# Patient Record
Sex: MALE | Race: WHITE | Employment: FULL TIME | URBAN - METROPOLITAN AREA
[De-identification: names, ages, dates, MRNs, and addresses within clinical notes are randomized per-mention and may not be internally consistent; named-entity substitution may affect disease eponyms.]

---

## 2018-11-15 ENCOUNTER — HOSPITAL ENCOUNTER (EMERGENCY)
Age: 43
Discharge: HOME | End: 2018-11-15
Attending: EMERGENCY MEDICINE
Payer: MEDICAID

## 2018-11-15 VITALS
OXYGEN SATURATION: 100 % | HEART RATE: 95 BPM | HEIGHT: 72 IN | SYSTOLIC BLOOD PRESSURE: 154 MMHG | WEIGHT: 195 LBS | BODY MASS INDEX: 26.41 KG/M2 | DIASTOLIC BLOOD PRESSURE: 96 MMHG | TEMPERATURE: 97.8 F | RESPIRATION RATE: 18 BRPM

## 2018-11-15 DIAGNOSIS — K04.7 DENTAL INFECTION: ICD-10-CM

## 2018-11-15 DIAGNOSIS — S02.5XXB OPEN FRACTURE OF TOOTH, INITIAL ENCOUNTER: ICD-10-CM

## 2018-11-15 DIAGNOSIS — R11.0 NAUSEA: ICD-10-CM

## 2018-11-15 DIAGNOSIS — R51.9 FACIAL PAIN: Primary | ICD-10-CM

## 2018-11-15 PROCEDURE — 99282 EMERGENCY DEPT VISIT SF MDM: CPT

## 2018-11-15 PROCEDURE — 6370000000 HC RX 637 (ALT 250 FOR IP): Performed by: EMERGENCY MEDICINE

## 2018-11-15 RX ORDER — PENICILLIN V POTASSIUM 500 MG/1
500 TABLET ORAL ONCE
Status: COMPLETED | OUTPATIENT
Start: 2018-11-15 | End: 2018-11-15

## 2018-11-15 RX ORDER — PENICILLIN V POTASSIUM 500 MG/1
500 TABLET ORAL 4 TIMES DAILY
Qty: 40 TABLET | Refills: 0 | Status: SHIPPED | OUTPATIENT
Start: 2018-11-15 | End: 2018-11-25

## 2018-11-15 RX ADMIN — PENICILLIN V POTASIUM 500 MG: 500 TABLET OROPHARYNGEAL at 05:02

## 2018-11-15 ASSESSMENT — PAIN DESCRIPTION - LOCATION: LOCATION: HEAD;FACE

## 2018-11-15 ASSESSMENT — PAIN DESCRIPTION - PAIN TYPE: TYPE: ACUTE PAIN

## 2018-11-15 ASSESSMENT — PAIN SCALES - GENERAL: PAINLEVEL_OUTOF10: 6

## 2018-11-15 NOTE — ED PROVIDER NOTES
----- Message from Yasmeen Hoffman sent at 11/28/2017  3:10 PM CST -----  Contact: wife  Wife called to schedule OV per ED for broken back. Nothing was available until 1/22/18. Please contact Bekah Nunez at [phone].    Thanks!   potassium (VEETID) 500 MG tablet Take 1 tablet by mouth 4 times daily for 10 days 40 tablet 0    benzocaine (ORAJEL) 10 % mucosal gel Take by mouth as needed. 1 Tube 0     No Known Allergies    Nursing Notes Reviewed    Physical Exam:      ED TRIAGE VITALS  BP: (!) 154/96, Temp: 97.8 °F (36.6 °C), Pulse: 95, Resp: 18, SpO2: 100 %    My pulse ox interpretation is - normal    General appearance:  Anxious appearing, pacing the room. Skin:  Warm. Dry. Eye:  Extraocular movements intact. Ears, nose, mouth and throat:  Oral mucosa moist, tender to palpation over right mandible with point tenderness over the molar/premolar area. THere is some gum line swelling, no trismus, no oral swelling. Tooth #2 completely broken off the level of the gums with brown plaque and cavity noted. Tooth #1 has a hole in the anterior aspect with root exposed. Tooth #3 also with cavity noted. Gum swelling around this area without fluctuance. With palpation over this area he has reproduction of his pain and it shoots toward the temple. No focal temple tenderness or swelling on exam.     Neck:  Trachea midline. Extremity:  No swelling. Normal ROM     Heart:  Regular rate and rhythm  Respiratory: Respirations nonlabored. Abdominal:   Soft. Nontender. Non distended. Neurological:  Alert and oriented, moves all extremities, up and pacing the room when I entered. Psychiatric:  Anxious appearing    I have reviewed and interpreted all of the currently available lab results from this visit (if applicable):  No results found for this visit on 11/15/18. Radiographs (if obtained):  [] The following radiograph was interpreted by myself in the absence of a radiologist:   [] Radiologist's Report Reviewed:  No orders to display         EKG (if obtained): (All EKG's are interpreted by myself in the absence of a cardiologist)    Chart review shows recent radiographs:  No results found.     MDM:  61-year-old male history as above

## 2021-10-26 ENCOUNTER — OFFICE VISIT (OUTPATIENT)
Dept: FAMILY MEDICINE CLINIC | Age: 46
End: 2021-10-26
Payer: MEDICAID

## 2021-10-26 VITALS
WEIGHT: 175.2 LBS | DIASTOLIC BLOOD PRESSURE: 82 MMHG | BODY MASS INDEX: 25.95 KG/M2 | HEIGHT: 69 IN | OXYGEN SATURATION: 98 % | SYSTOLIC BLOOD PRESSURE: 132 MMHG | TEMPERATURE: 97.4 F | HEART RATE: 84 BPM

## 2021-10-26 DIAGNOSIS — F29 PSYCHOSIS, UNSPECIFIED PSYCHOSIS TYPE (HCC): Primary | ICD-10-CM

## 2021-10-26 DIAGNOSIS — T81.89XA RETAINED SUTURE, INITIAL ENCOUNTER: ICD-10-CM

## 2021-10-26 DIAGNOSIS — S22.001S CLOSED BURST FRACTURE OF THORACIC VERTEBRA, SEQUELA: ICD-10-CM

## 2021-10-26 DIAGNOSIS — S92.001S: ICD-10-CM

## 2021-10-26 DIAGNOSIS — F17.200 SMOKER: ICD-10-CM

## 2021-10-26 DIAGNOSIS — Z18.9 RETAINED SUTURE, INITIAL ENCOUNTER: ICD-10-CM

## 2021-10-26 DIAGNOSIS — S92.002S: ICD-10-CM

## 2021-10-26 DIAGNOSIS — Z98.890 S/P HARDWARE REMOVAL: ICD-10-CM

## 2021-10-26 PROBLEM — S32.000A COMPRESSION FRACTURE OF LUMBAR VERTEBRA (HCC): Status: ACTIVE | Noted: 2021-10-26

## 2021-10-26 PROBLEM — S22.001A CLOSED BURST FRACTURE OF THORACIC VERTEBRA (HCC): Status: ACTIVE | Noted: 2021-10-26

## 2021-10-26 PROBLEM — S92.002A BILATERAL CALCANEAL FRACTURES: Status: ACTIVE | Noted: 2021-07-27

## 2021-10-26 PROBLEM — S92.001A BILATERAL CALCANEAL FRACTURES: Status: ACTIVE | Noted: 2021-07-27

## 2021-10-26 PROCEDURE — 99204 OFFICE O/P NEW MOD 45 MIN: CPT | Performed by: FAMILY MEDICINE

## 2021-10-26 RX ORDER — NICOTINE 21 MG/24HR
1 PATCH, TRANSDERMAL 24 HOURS TRANSDERMAL DAILY
Qty: 42 PATCH | Refills: 0 | Status: SHIPPED | OUTPATIENT
Start: 2021-10-26 | End: 2021-12-07 | Stop reason: SDUPTHER

## 2021-10-26 RX ORDER — QUETIAPINE 300 MG/1
300 TABLET, FILM COATED, EXTENDED RELEASE ORAL NIGHTLY
COMMUNITY
End: 2021-12-07

## 2021-10-26 RX ORDER — BUPROPION HYDROCHLORIDE 150 MG/1
150 TABLET, EXTENDED RELEASE ORAL 2 TIMES DAILY
Qty: 60 TABLET | Refills: 0 | Status: SHIPPED | OUTPATIENT
Start: 2021-10-26 | End: 2021-12-07 | Stop reason: SDUPTHER

## 2021-10-26 RX ORDER — GABAPENTIN 300 MG/1
300 CAPSULE ORAL 2 TIMES DAILY PRN
Qty: 60 CAPSULE | Refills: 0 | Status: SHIPPED | OUTPATIENT
Start: 2021-10-26 | End: 2021-11-18 | Stop reason: SDUPTHER

## 2021-10-26 SDOH — ECONOMIC STABILITY: TRANSPORTATION INSECURITY
IN THE PAST 12 MONTHS, HAS LACK OF TRANSPORTATION KEPT YOU FROM MEETINGS, WORK, OR FROM GETTING THINGS NEEDED FOR DAILY LIVING?: NO

## 2021-10-26 SDOH — ECONOMIC STABILITY: FOOD INSECURITY: WITHIN THE PAST 12 MONTHS, YOU WORRIED THAT YOUR FOOD WOULD RUN OUT BEFORE YOU GOT MONEY TO BUY MORE.: NEVER TRUE

## 2021-10-26 SDOH — ECONOMIC STABILITY: FOOD INSECURITY: WITHIN THE PAST 12 MONTHS, THE FOOD YOU BOUGHT JUST DIDN'T LAST AND YOU DIDN'T HAVE MONEY TO GET MORE.: NEVER TRUE

## 2021-10-26 SDOH — ECONOMIC STABILITY: TRANSPORTATION INSECURITY
IN THE PAST 12 MONTHS, HAS THE LACK OF TRANSPORTATION KEPT YOU FROM MEDICAL APPOINTMENTS OR FROM GETTING MEDICATIONS?: NO

## 2021-10-26 ASSESSMENT — ENCOUNTER SYMPTOMS
DIARRHEA: 0
ABDOMINAL PAIN: 0
SHORTNESS OF BREATH: 0
COUGH: 0
VOMITING: 0

## 2021-10-26 ASSESSMENT — PATIENT HEALTH QUESTIONNAIRE - PHQ9
2. FEELING DOWN, DEPRESSED OR HOPELESS: 1
SUM OF ALL RESPONSES TO PHQ QUESTIONS 1-9: 1
1. LITTLE INTEREST OR PLEASURE IN DOING THINGS: 0
SUM OF ALL RESPONSES TO PHQ QUESTIONS 1-9: 1
SUM OF ALL RESPONSES TO PHQ QUESTIONS 1-9: 1
SUM OF ALL RESPONSES TO PHQ9 QUESTIONS 1 & 2: 1

## 2021-10-26 ASSESSMENT — SOCIAL DETERMINANTS OF HEALTH (SDOH): HOW HARD IS IT FOR YOU TO PAY FOR THE VERY BASICS LIKE FOOD, HOUSING, MEDICAL CARE, AND HEATING?: NOT HARD AT ALL

## 2021-10-26 NOTE — PROGRESS NOTES
10/26/21    Orlin Bolaños  1975    SUBJECTIVE    HPI - Ana Rosa Castillo is a 39 y.o. male who presents today for evaluation of:  Chief Complaint   Patient presents with   Tyrel Hodge Doctor     June 2021 had bilateral calcaneus fractures and low thoracic compression fractures that were fused. He fell off of a ladder and sustained compression fractures of low thoracic vertebrae and fractures of both heels. They were all treated surgically. He had a fusion in his back. One of the screws in one of his feet was removed but screws remain in both heels. One suture was partially cut but not removed and he was told that it would work its way out. It continues to bother him with the suture being right over the posterior heel where it rubs against the back of his shoe. He is almost gotten the suture out on his own but has not been able to. He is set up to see a pain management doctor soon and is interested in a form of pain management that is not an opioid. When ran out of Percocet gabapentin helped. Smoking : He would like help with quitting smoking. Auditory hallucinations : since 2017. He has been recently taking Seroquel which she states is not especially helpful. He had a bad reaction with risperidone previously. Former functioning alcoholic : Quit heavy drinking 10/1/2020. He had used alcohol to self medicate the hallucinations. Hallucination example other people talking about the salad he was making or everything he did and there was a ring lead named Mayashanon Correia and his friend Dona who were in the hallucinations. Retained suture :  Left post ankle. There is a retained suture that he would like to have removed if possible. The area is inflamed and occasionally pus comes out. Review of Systems   Constitutional: Negative for fatigue and fever. Respiratory: Negative for cough and shortness of breath. Cardiovascular: Negative for chest pain and leg swelling.    Gastrointestinal: Negative for abdominal pain, diarrhea and vomiting. Psychiatric/Behavioral: Negative for dysphoric mood. Allergies   Allergen Reactions    Risperidone Other (See Comments)     Makes pt feel like he has Parkinson          OBJECTIVE    /82 (Site: Left Upper Arm, Position: Sitting, Cuff Size: Medium Adult)   Pulse 84   Temp 97.4 °F (36.3 °C) (Infrared)   Ht 5' 9\" (1.753 m)   Wt 175 lb 3.2 oz (79.5 kg)   SpO2 98%   BMI 25.87 kg/m²     Physical Exam   Constitutional:       General: Not in acute distress. Appearance: Normal appearance. Not ill-appearing. Eyes:      General: No scleral icterus. Cardiovascular:      Rate and Rhythm: Normal rate and regular rhythm. Heart sounds: No murmur heard. No friction rub. No gallop. Strong manolo PT and DP pulses. Pulmonary:      Effort: Pulmonary effort is normal. No respiratory distress. Breath sounds: No wheezing, rhonchi or rales. Abdominal:      Palpations: Abdomen is soft. There is no mass. Tenderness: There is no abdominal tenderness. Musculoskeletal:     Moves all extremities normally. Skin:     General: Skin is warm. Coloration: Skin is not jaundiced. There is an area on the side of one of his heels where the wounds are healing but are somewhat slow to heal.  On the left posterior foot there was a spot with a little seepage of fluid. I was able to use a fine-tipped forceps and pull out a piece of suture which included the suture knot. He stated it felt better as soon as it came out. Neurological:      Mental Status: She is alert. Psychiatric:         Behavior: Behavior normal.         Thought Content: He reports auditory hallucinations dating back to 2017. He describes conversations between the people in the auditory hallucinations including a ring leader named Piter Valiente and [facility] friend named Dona. Frequently the auditory hallucinations would comment on what he was doing such as preparing a salad or preparing salsa. Sometimes they were bothersome and sometimes they did not bother him. He does report that the hallucinations did not follow him to skilled nursing when he had one night that he had to spend in skilled nursing. Judgment: Judgment normal.      Reviewed Labs: 8/8/2021 BMP and CMP are remarkable for anemia. The anemia trended with previous CBC levels show a gradually improving hemoglobin which was 10.8 or 10.6 most recently. On 8/6/2021 he had normal albumin, prealbumin, and vitamin D levels. ASSESSMENT/PLAN:    1. Psychosis, unspecified psychosis type [facility])  Assessment & Plan:   He has had auditory hallucinations for about 4 years. 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 He reported a history of moving his hands a lot and sometimes popping has had which I did not notice during the encounter. He certainly does not strike me as having akathisia. I will refer him to a neurologist.  I was not quite sure whether a neurologist or a psychiatrist was most appropriate but felt that a neurologist would be a good starting point since a previous provider had given him a diagnosis of parkinsonism with Lewy bodies. Orders:  -     [facility]  2. S/P hardware removal  Assessment & Plan:   He is having some continued pain in his heels even after 1 screw that poked through to the dorsal side of the calcaneus was removed. I will prescribe some gabapentin to help. He is also set up to see a pain management specialist.  I will also refer to a podiatrist who could possibly fit him with orthotics. 3. Closed burst fracture of thoracic vertebra, sequela  Assessment & Plan:   Until he gets to pain management I am hoping that gabapentin helps some with the pain. I told him that ibuprofen 400 mg or 600 mg should give about the same amount of pain control as 800 mg ibuprofen with less risk of kidney damage.   4. Open fracture of both calcanei, sequela  Assessment & Plan:   I'll try gabapentin to help with pain. Checked PDMP and last CS was 10/1/21 7 d supply of Percocet 5mg qid. Orders:  -     Amb External Referral To [address], DO, Orthopedic Surgery, [place]  -     External Referral To Physical Therapy  -     gabapentin (NEURONTIN) 300 MG capsule; Take 1 capsule by mouth 2 times daily as needed (pain) for up to 30 days. Intended supply: 30 days, Disp-60 capsule, R-0Normal  5. Smoker  Assessment & Plan:   He is interested in quitting smoking and I prescribed concurrent bupropion and nicotine patches. Orders:  -     buPROPion (ZYBAN) 150 MG extended release tablet; Take 1 tablet by mouth 2 times daily, Disp-60 tablet, R-0Normal  -     nicotine (NICODERM CQ) 21 MG/24HR; Place 1 patch onto the skin daily, Disp-42 patch, R-0Normal  6. Retained suture, initial encounter  Using a fine forceps I was able to pull out the suture. He felt better right away. There was some bleeding (< half cc)  which I felt was likely helpful to help cleanse the inside of the wounds. After blotting a few drops of blood I applied Band-Aids. Counseling provided for:    >> Exercise - 1> try to do 150 minutes a week of exercise that is as hard as walking briskly (30 minutes 5 days a week or 22 minutes every day); and 2> do some strength training 2 or 3 times a week. I support quitting tobacco.        Return in about 6 weeks (around 12/7/2021) for [address] [place] [address]. 55 minutes were spent this calendar day in pre-charting and chart review; with the patient doing history, exam, medical decision making, and counseling; and completing orders and documentation.     Kyra Garner MD

## 2021-10-26 NOTE — ASSESSMENT & PLAN NOTE
He has had auditory hallucinations for about 4 years. 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 He reported a history of moving his hands a lot and sometimes popping has had which I did not notice during the encounter. He certainly does not strike me as having akathisia. I will refer him to a neurologist.  I was not quite sure whether a neurologist or a psychiatrist was most appropriate but felt that a neurologist would be a good starting point since a previous provider had given him a diagnosis of parkinsonism with Lewy bodies.

## 2021-10-26 NOTE — ASSESSMENT & PLAN NOTE
He is having some continued pain in his heels even after 1 screw that poked through to the dorsal side of the calcaneus was removed. I will prescribe some gabapentin to help. He is also set up to see a pain management specialist.  I will also refer to a podiatrist who could possibly fit him with orthotics.

## 2021-10-26 NOTE — ASSESSMENT & PLAN NOTE
Until he gets to pain management I am hoping that gabapentin helps some with the pain. I told him that ibuprofen 400 mg or 600 mg should give about the same amount of pain control as 800 mg ibuprofen with less risk of kidney damage.

## 2021-10-26 NOTE — PATIENT INSTRUCTIONS
Patient Education        Stopping Smoking: Care Instructions  Your Care Instructions     Cigarette smokers crave the nicotine in cigarettes. Giving it up is much harder than simply changing a habit. Your body has to stop craving the nicotine. It is hard to quit, but you can do it. There are many tools that people use to quit smoking. You may find that combining tools works best for you. There are several steps to quitting. First you get ready to quit. Then you get support to help you. After that, you learn new skills and behaviors to become a nonsmoker. For many people, a necessary step is getting and using medicine. Your doctor will help you set up the plan that best meets your needs. You may want to attend a smoking cessation program to help you quit smoking. When you choose a program, look for one that has proven success. Ask your doctor for ideas. You will greatly increase your chances of success if you take medicine as well as get counseling or join a cessation program.  Some of the changes you feel when you first quit tobacco are uncomfortable. Your body will miss the nicotine at first, and you may feel short-tempered and grumpy. You may have trouble sleeping or concentrating. Medicine can help you deal with these symptoms. You may struggle with changing your smoking habits and rituals. The last step is the tricky one: Be prepared for the smoking urge to continue for a time. This is a lot to deal with, but keep at it. You will feel better. Follow-up care is a key part of your treatment and safety. Be sure to make and go to all appointments, and call your doctor if you are having problems. It's also a good idea to know your test results and keep a list of the medicines you take. How can you care for yourself at home? · Ask your family, friends, and coworkers for support. You have a better chance of quitting if you have help and support.   · Join a support group, such as Nicotine Anonymous, for people who are trying to quit smoking. · Consider signing up for a smoking cessation program, such as the American Lung Association's Freedom from Smoking program.  · Get text messaging support. Go to the website at www.smokefree. gov to sign up for the [facility] program.  · Set a quit date. Pick your date carefully so that it is not right in the middle of a big deadline or stressful time. Once you quit, do not even take a puff. Get rid of all ashtrays and lighters after your last cigarette. Clean your house and your clothes so that they do not smell of smoke. · Learn how to be a nonsmoker. Think about ways you can avoid those things that make you reach for a cigarette. ? Avoid situations that put you at greatest risk for smoking. For some people, it is hard to have a drink with friends without smoking. For others, they might skip a coffee break with coworkers who smoke. ? Change your daily routine. Take a different route to work or eat a meal in a different place. · Cut down on stress. Calm yourself or release tension by doing an activity you enjoy, such as reading a book, taking a hot bath, or gardening. · Talk to your doctor or pharmacist about nicotine replacement therapy, which replaces the nicotine in your body. You still get nicotine but you do not use tobacco. Nicotine replacement products help you slowly reduce the amount of nicotine you need. These products come in several forms, many of them available over-the-counter:  ? Nicotine patches  ? Nicotine gum and lozenges  ? Nicotine inhaler  · Ask your doctor about bupropion (Wellbutrin) or varenicline (Chantix), which are prescription medicines. They do not contain nicotine. They help you by reducing withdrawal symptoms, such as stress and anxiety. · Some people find hypnosis, acupuncture, and massage helpful for ending the smoking habit. · Eat a healthy diet and get regular exercise.  Having healthy habits will help your body move past its craving for nicotine. · Be prepared to keep trying. Most people are not successful the first few times they try to quit. Do not get mad at yourself if you smoke again. Make a list of things you learned and think about when you want to try again, such as next week, next month, or next year. Where can you learn more? Go to https://[employer]pepicewoliverio.[employer]. org and sign in to your [employer] account. Enter O228 in the NearbyNow box to learn more about \"Stopping Smoking: Care Instructions. \"     If you do not have an account, please click on the \"Sign Up Now\" link. Current as of: February 11, 2021               Content Version: 13.0  © 2006-2021 Healthwise, Incorporated. Care instructions adapted under license by [facility] ([facility]). If you have questions about a medical condition or this instruction, always ask your healthcare professional. [address] any warranty or liability for your use of this information.

## 2021-10-26 NOTE — ASSESSMENT & PLAN NOTE
I'll try gabapentin to help with pain. Checked PDMP and last CS was 10/1/21 7 d supply of Percocet 5mg qid.

## 2021-10-29 ENCOUNTER — TELEPHONE (OUTPATIENT)
Dept: FAMILY MEDICINE CLINIC | Age: 46
End: 2021-10-29

## 2021-10-29 DIAGNOSIS — M62.50 MUSCULAR ATROPHY, UNSPECIFIED SITE: Primary | ICD-10-CM

## 2021-10-29 NOTE — TELEPHONE ENCOUNTER
Patient called stating the gym in [place] will accept [employer] for patient to be able to use their equipment and pool to help him. 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

## 2021-10-29 NOTE — TELEPHONE ENCOUNTER
----- Message from Tamiko Muse sent at 10/29/2021  1:13 PM EDT -----  Subject: Referral Request    QUESTIONS   Reason for referral request? gym membership   Has the physician seen you for this condition before? No   Preferred Specialist (if applicable)? Do you already have an appointment scheduled? Additional Information for Provider?   ---------------------------------------------------------------------------  --------------  CALL BACK INFO  What is the best way for the office to contact you? OK to leave message on   voicemail  Preferred Call Back Phone Number?  [phone]

## 2021-11-18 ENCOUNTER — TELEPHONE (OUTPATIENT)
Dept: FAMILY MEDICINE CLINIC | Age: 46
End: 2021-11-18

## 2021-11-18 DIAGNOSIS — S92.002S: ICD-10-CM

## 2021-11-18 DIAGNOSIS — S92.001S: ICD-10-CM

## 2021-11-18 RX ORDER — GABAPENTIN 300 MG/1
600 CAPSULE ORAL 2 TIMES DAILY PRN
Qty: 120 CAPSULE | Refills: 0 | Status: SHIPPED | OUTPATIENT
Start: 2021-11-18 | End: 2021-12-07 | Stop reason: SDUPTHER

## 2021-11-18 NOTE — TELEPHONE ENCOUNTER
It seems reasonable to increase the gabapentin to 1200 mg daily especially since that is helping him to be able to work. I will send a prescription for a higher dose to the pharmacy and check PDMP.

## 2021-11-18 NOTE — TELEPHONE ENCOUNTER
Since patient started back to work, he has been taking Gabapentin 1200 mg a day. Patient states this is helping his pain when walking and wanted to know if the dose can be changed. He does not take this in the evening, only during working hours. Wellbutrin also needs refill.

## 2021-12-01 ENCOUNTER — TELEPHONE (OUTPATIENT)
Dept: FAMILY MEDICINE CLINIC | Age: 46
End: 2021-12-01

## 2021-12-01 DIAGNOSIS — S92.002D CLOSED FRACTURE OF BOTH CALCANEI WITH ROUTINE HEALING, SUBSEQUENT ENCOUNTER: Primary | ICD-10-CM

## 2021-12-01 DIAGNOSIS — S92.001D CLOSED FRACTURE OF BOTH CALCANEI WITH ROUTINE HEALING, SUBSEQUENT ENCOUNTER: Primary | ICD-10-CM

## 2021-12-01 RX ORDER — IBUPROFEN 600 MG/1
600 TABLET ORAL EVERY 8 HOURS PRN
Qty: 60 TABLET | Refills: 0 | Status: SHIPPED | OUTPATIENT
Start: 2021-12-01 | End: 2021-12-07 | Stop reason: SDUPTHER

## 2021-12-01 NOTE — TELEPHONE ENCOUNTER
----- Message from Lia Bird sent at 12/1/2021  4:36 PM EST -----  Subject: Message to Provider    QUESTIONS  Information for Provider? patient needs pain med sent to pharmacy. Can he   have Ibuprofen sent or some type of pain med. He is working in [place]   and will be in to see  next week. Staying at [facility]. please sent to pharmacy at [facility] in [place], [place]   ---------------------------------------------------------------------------  --------------  [address]  What is the best way for the office to contact you? OK to leave message on   voicemail  Preferred Call Back Phone Number? [phone]  ---------------------------------------------------------------------------  --------------  SCRIPT ANSWERS  Relationship to Patient?  Self

## 2021-12-01 NOTE — TELEPHONE ENCOUNTER
There are 3 [facility] pharmacies in [place]. I sent it to the one at [facility]. However usually pharmacies in the same chain can transfer a prescription easily from 1 store to another.

## 2021-12-07 ENCOUNTER — HOSPITAL ENCOUNTER (OUTPATIENT)
Dept: GENERAL RADIOLOGY | Age: 46
Discharge: HOME | End: 2021-12-07
Payer: MEDICAID

## 2021-12-07 ENCOUNTER — HOSPITAL ENCOUNTER (OUTPATIENT)
Age: 46
Discharge: HOME | End: 2021-12-07
Payer: MEDICAID

## 2021-12-07 ENCOUNTER — OFFICE VISIT (OUTPATIENT)
Dept: ORTHOPEDIC SURGERY | Age: 46
End: 2021-12-07
Payer: MEDICAID

## 2021-12-07 ENCOUNTER — OFFICE VISIT (OUTPATIENT)
Dept: FAMILY MEDICINE CLINIC | Age: 46
End: 2021-12-07
Payer: MEDICAID

## 2021-12-07 VITALS
OXYGEN SATURATION: 99 % | HEART RATE: 76 BPM | BODY MASS INDEX: 26.43 KG/M2 | DIASTOLIC BLOOD PRESSURE: 66 MMHG | SYSTOLIC BLOOD PRESSURE: 122 MMHG | WEIGHT: 179 LBS | TEMPERATURE: 98.6 F

## 2021-12-07 VITALS
DIASTOLIC BLOOD PRESSURE: 82 MMHG | BODY MASS INDEX: 27.2 KG/M2 | HEART RATE: 99 BPM | OXYGEN SATURATION: 83 % | WEIGHT: 184.2 LBS | SYSTOLIC BLOOD PRESSURE: 124 MMHG | TEMPERATURE: 98.6 F

## 2021-12-07 VITALS
OXYGEN SATURATION: 98 % | RESPIRATION RATE: 16 BRPM | WEIGHT: 179 LBS | HEART RATE: 87 BPM | BODY MASS INDEX: 26.51 KG/M2 | HEIGHT: 69 IN

## 2021-12-07 DIAGNOSIS — M79.671 RIGHT FOOT PAIN: ICD-10-CM

## 2021-12-07 DIAGNOSIS — S92.002D CLOSED FRACTURE OF BOTH CALCANEI WITH ROUTINE HEALING, SUBSEQUENT ENCOUNTER: ICD-10-CM

## 2021-12-07 DIAGNOSIS — F17.200 SMOKER: ICD-10-CM

## 2021-12-07 DIAGNOSIS — S92.001D CLOSED FRACTURE OF BOTH CALCANEI WITH ROUTINE HEALING, SUBSEQUENT ENCOUNTER: ICD-10-CM

## 2021-12-07 DIAGNOSIS — S92.001S CLOSED BILATERAL CALCANEAL FRACTURES, SEQUELA: ICD-10-CM

## 2021-12-07 DIAGNOSIS — S92.002A CLOSED FRACTURE OF BOTH CALCANEI, INITIAL ENCOUNTER: Primary | ICD-10-CM

## 2021-12-07 DIAGNOSIS — M79.672 LEFT FOOT PAIN: ICD-10-CM

## 2021-12-07 DIAGNOSIS — S92.002S CLOSED BILATERAL CALCANEAL FRACTURES, SEQUELA: ICD-10-CM

## 2021-12-07 DIAGNOSIS — S92.001A CLOSED FRACTURE OF BOTH CALCANEI, INITIAL ENCOUNTER: Primary | ICD-10-CM

## 2021-12-07 DIAGNOSIS — Z00.00 ENCOUNTER FOR WELL ADULT EXAM WITHOUT ABNORMAL FINDINGS: Primary | ICD-10-CM

## 2021-12-07 PROCEDURE — G8484 FLU IMMUNIZE NO ADMIN: HCPCS | Performed by: STUDENT IN AN ORGANIZED HEALTH CARE EDUCATION/TRAINING PROGRAM

## 2021-12-07 PROCEDURE — 4004F PT TOBACCO SCREEN RCVD TLK: CPT | Performed by: FAMILY MEDICINE

## 2021-12-07 PROCEDURE — G8419 CALC BMI OUT NRM PARAM NOF/U: HCPCS | Performed by: STUDENT IN AN ORGANIZED HEALTH CARE EDUCATION/TRAINING PROGRAM

## 2021-12-07 PROCEDURE — G8427 DOCREV CUR MEDS BY ELIG CLIN: HCPCS | Performed by: STUDENT IN AN ORGANIZED HEALTH CARE EDUCATION/TRAINING PROGRAM

## 2021-12-07 PROCEDURE — G8419 CALC BMI OUT NRM PARAM NOF/U: HCPCS | Performed by: FAMILY MEDICINE

## 2021-12-07 PROCEDURE — G8427 DOCREV CUR MEDS BY ELIG CLIN: HCPCS | Performed by: FAMILY MEDICINE

## 2021-12-07 PROCEDURE — 4004F PT TOBACCO SCREEN RCVD TLK: CPT | Performed by: STUDENT IN AN ORGANIZED HEALTH CARE EDUCATION/TRAINING PROGRAM

## 2021-12-07 PROCEDURE — G8484 FLU IMMUNIZE NO ADMIN: HCPCS | Performed by: FAMILY MEDICINE

## 2021-12-07 PROCEDURE — 99213 OFFICE O/P EST LOW 20 MIN: CPT | Performed by: FAMILY MEDICINE

## 2021-12-07 PROCEDURE — 99203 OFFICE O/P NEW LOW 30 MIN: CPT | Performed by: STUDENT IN AN ORGANIZED HEALTH CARE EDUCATION/TRAINING PROGRAM

## 2021-12-07 PROCEDURE — 73630 X-RAY EXAM OF FOOT: CPT

## 2021-12-07 PROCEDURE — 99999 PR OFFICE/OUTPT VISIT,PROCEDURE ONLY: CPT | Performed by: FAMILY MEDICINE

## 2021-12-07 PROCEDURE — 99396 PREV VISIT EST AGE 40-64: CPT | Performed by: FAMILY MEDICINE

## 2021-12-07 RX ORDER — IBUPROFEN 600 MG/1
600 TABLET ORAL EVERY 8 HOURS PRN
Qty: 90 TABLET | Refills: 0 | Status: SHIPPED | OUTPATIENT
Start: 2021-12-07 | End: 2022-03-30 | Stop reason: SDUPTHER

## 2021-12-07 RX ORDER — GABAPENTIN 300 MG/1
600 CAPSULE ORAL 3 TIMES DAILY
Qty: 120 CAPSULE | Refills: 0 | Status: SHIPPED | OUTPATIENT
Start: 2021-12-07 | End: 2022-03-30 | Stop reason: SDUPTHER

## 2021-12-07 RX ORDER — BUPROPION HYDROCHLORIDE 150 MG/1
150 TABLET, EXTENDED RELEASE ORAL 2 TIMES DAILY
Qty: 60 TABLET | Refills: 2 | Status: SHIPPED | OUTPATIENT
Start: 2021-12-07

## 2021-12-07 RX ORDER — GABAPENTIN 300 MG/1
600 CAPSULE ORAL 2 TIMES DAILY
Qty: 120 CAPSULE | Refills: 1 | Status: SHIPPED | OUTPATIENT
Start: 2022-01-06 | End: 2022-07-05

## 2021-12-07 RX ORDER — NICOTINE 21 MG/24HR
1 PATCH, TRANSDERMAL 24 HOURS TRANSDERMAL DAILY
Qty: 42 PATCH | Refills: 0 | Status: SHIPPED | OUTPATIENT
Start: 2021-12-07 | End: 2022-01-18

## 2021-12-07 RX ORDER — BUPROPION HYDROCHLORIDE 150 MG/1
150 TABLET, EXTENDED RELEASE ORAL 2 TIMES DAILY
Qty: 60 TABLET | Refills: 0 | Status: SHIPPED | OUTPATIENT
Start: 2021-12-07

## 2021-12-07 RX ORDER — BUPROPION HYDROCHLORIDE 150 MG/1
150 TABLET, EXTENDED RELEASE ORAL 2 TIMES DAILY
Qty: 60 TABLET | Refills: 0 | Status: CANCELLED | OUTPATIENT
Start: 2021-12-07

## 2021-12-07 RX ORDER — IBUPROFEN 600 MG/1
600 TABLET ORAL EVERY 8 HOURS PRN
Qty: 60 TABLET | Refills: 0 | Status: CANCELLED | OUTPATIENT
Start: 2021-12-07

## 2021-12-07 RX ORDER — BUPROPION HYDROCHLORIDE 150 MG/1
TABLET, EXTENDED RELEASE ORAL
COMMUNITY
Start: 2021-10-26 | End: 2021-12-07

## 2021-12-07 RX ORDER — NICOTINE 21 MG/24HR
1 PATCH, TRANSDERMAL 24 HOURS TRANSDERMAL DAILY
Qty: 42 PATCH | Refills: 2 | Status: SHIPPED | OUTPATIENT
Start: 2021-12-07 | End: 2022-01-18

## 2021-12-07 RX ORDER — IBUPROFEN 600 MG/1
600 TABLET ORAL EVERY 8 HOURS PRN
Qty: 90 TABLET | Refills: 1 | Status: SHIPPED | OUTPATIENT
Start: 2021-12-07

## 2021-12-07 ASSESSMENT — ENCOUNTER SYMPTOMS
APNEA: 0
COLOR CHANGE: 0
COUGH: 0
NAUSEA: 0
BACK PAIN: 0
DIARRHEA: 0
EYE PAIN: 0
SHORTNESS OF BREATH: 0
SORE THROAT: 0
SHORTNESS OF BREATH: 0
WHEEZING: 0
ABDOMINAL PAIN: 0
TROUBLE SWALLOWING: 0
VOMITING: 0
COUGH: 0
NAUSEA: 0
VOICE CHANGE: 0
CONSTIPATION: 0
VOMITING: 0

## 2021-12-07 NOTE — PROGRESS NOTES
12/7/21    Sebastian Ray  1975  39 y.o. male     Adult Annual Preventive Visit  Chief Complaint   Patient presents with    Medication Refill       Activity habits: plenty of exercise on the job. Eating habits: Pretty healthfully despite being on the road. Sleep habits: good    Social support: Good  Loneliness: no - working in [place] until Feb and then he weill have other out of town jobs. Mentation:   Learning new things  Trouble with memory or thinking clearly: no    Still has some auditory hallucinations. E/M : Heel pain : He continues to have heel pain. Gabapentin helps as well as ibuprofen. He is working in [place] and will need prescriptions sent to a pharmacy in [place] to be refilled in about a month when he runs out of this month supply of medications. Review of Systems   Constitutional: Negative for fatigue and fever. HENT: Negative for nosebleeds and trouble swallowing. Eyes: Negative for pain and visual disturbance. Respiratory: Negative for apnea, cough and shortness of breath. Cardiovascular: Negative for chest pain and leg swelling. Gastrointestinal: Negative for abdominal pain, constipation, diarrhea, nausea and vomiting. Endocrine: Negative for cold intolerance, heat intolerance and polyuria. Genitourinary: Negative for difficulty urinating and hematuria. Musculoskeletal: Negative for arthralgias and gait problem. Skin: Negative for rash and wound. Allergic/Immunologic: Negative for food allergies and immunocompromised state. Neurological: Negative for speech difficulty, light-headedness and headaches. Hematological: Negative for adenopathy. Does not bruise/bleed easily. Psychiatric/Behavioral: Positive for decreased concentration and hallucinations (annoying but not bad - like a TV comercial or Play by play. ). Negative for dysphoric mood, self-injury and suicidal ideas. The patient is nervous/anxious.       Fasting: No    Allergies Allergen Reactions    Risperidone Other (See Comments)     Makes pt feel like he has Parkinson          Current Outpatient Medications   Medication Sig Dispense Refill    buPROPion (ZYBAN) 150 MG extended release tablet Take 1 tablet by mouth 2 times daily 60 tablet 0    ibuprofen (ADVIL;MOTRIN) 600 MG tablet Take 1 tablet by mouth every 8 hours as needed for Pain 90 tablet 0    gabapentin (NEURONTIN) 300 MG capsule Take 2 capsules by mouth 3 times daily for 30 days. Intended supply: 30 days 120 capsule 0    nicotine (NICODERM CQ) 21 MG/24HR Place 1 patch onto the skin daily 42 patch 0    nicotine (NICODERM CQ) 21 MG/24HR Place 1 patch onto the skin daily 42 patch 2    buPROPion (ZYBAN) 150 MG extended release tablet Take 1 tablet by mouth 2 times daily 60 tablet 2    ibuprofen (ADVIL;MOTRIN) 600 MG tablet Take 1 tablet by mouth every 8 hours as needed for Pain 90 tablet 1    [START ON 1/6/2022] gabapentin (NEURONTIN) 300 MG capsule Take 2 capsules by mouth 2 times daily for 180 days. Intended supply: 30 days 120 capsule 1     No current facility-administered medications for this visit. OBJECTIVE    /82 (Site: Left Upper Arm, Position: Sitting, Cuff Size: Medium Adult)   Pulse 99   Temp 98.6 °F (37 °C) (Infrared)   Wt 184 lb 3.2 oz (83.6 kg)   SpO2 (!) 83%   BMI 27.20 kg/m²     Physical Exam   Constitutional:       General: Not in acute distress. Appearance: Normal appearance. Not ill-appearing, toxic-appearing or diaphoretic. HENT:      Head: Normocephalic. Right Ear: Tympanic membrane, ear canal and external ear normal.      Left Ear: Tympanic membrane, ear canal and external ear normal.      Nose: No rhinorrhea. Mouth/Throat:      Mouth: Mucous membranes are moist.      Pharynx: Oropharynx is clear. No oropharyngeal exudate or posterior oropharyngeal erythema. Eyes:      General: No scleral icterus. Right eye: No discharge. Left eye: No discharge. Conjunctiva/sclera: Conjunctivae normal.   Neck:      Thyroid: No thyroid mass, thyromegaly or thyroid tenderness. Cardiovascular:      Rate and Rhythm: Normal rate and regular rhythm. Pulses:           Posterior tibial pulses are 2+ on the right side and 2+ on the left side. Heart sounds: Normal heart sounds. No murmur heard. No friction rub. No gallop. Pulmonary:      Effort: Pulmonary effort is normal. No respiratory distress. Breath sounds: Normal breath sounds. No stridor. No wheezing, rhonchi or rales. Abdominal:      General: There is no distension. Palpations: Abdomen is soft. Tenderness: There is no abdominal tenderness. There is no guarding. Musculoskeletal:         General: No deformity. Cervical back: No rigidity. Right lower leg: No edema. Left lower leg: No edema. Lymphadenopathy:      Cervical: No cervical adenopathy. Right upper body: No supraclavicular or axillary adenopathy. Left upper body: No supraclavicular or axillary adenopathy. Lower Body: No right inguinal adenopathy. No left inguinal adenopathy. Skin:     General: Skin is warm. Coloration: Skin is not jaundiced. Neurological:      Mental Status: She is alert. Deep Tendon Reflexes:      Reflex Scores:       Patellar reflexes are 2+ on the right side and 2+ on the left side. Psychiatric:         Attention and Perception: Attention and perception normal.         Mood and Affect: Mood normal.         Speech: Speech normal.         Behavior: Behavior normal.         Thought Content: Thought content normal.      ASSESSMENT AND PLAN    1. Encounter for well adult exam without abnormal findings  2. Smoker  Assessment & Plan:   He is willing to use nicotine patches and Zyban in an effort to quit smoking. I sent the 1 month refill to [place] where he can get it in a month since he will be working in [place].   Orders:  -     buPROPion (ZYBAN) 150 MG extended release tablet; Take 1 tablet by mouth 2 times daily, Disp-60 tablet, R-0Normal  -     nicotine (NICODERM CQ) 21 MG/24HR; Place 1 patch onto the skin daily, Disp-42 patch, R-0Normal  -     nicotine (NICODERM CQ) 21 MG/24HR; Place 1 patch onto the skin daily, Disp-42 patch, R-2Normal  -     buPROPion (ZYBAN) 150 MG extended release tablet; Take 1 tablet by mouth 2 times daily, Disp-60 tablet, R-2Normal  3. Closed fracture of both calcanei with routine healing, subsequent encounter  Assessment & Plan:   I will prescribe gabapentin and ibuprofen with a month supply to be dispensed today here and with another prescription sent to a pharmacy in [place] where he can get it filled in about a month. Orders:  -     ibuprofen (ADVIL;MOTRIN) 600 MG tablet; Take 1 tablet by mouth every 8 hours as needed for Pain, Disp-90 tablet, R-0Normal  -     gabapentin (NEURONTIN) 300 MG capsule; Take 2 capsules by mouth 3 times daily for 30 days. Intended supply: 30 days, Disp-120 capsule, R-0Normal  -     ibuprofen (ADVIL;MOTRIN) 600 MG tablet; Take 1 tablet by mouth every 8 hours as needed for Pain, Disp-90 tablet, R-1Normal  -     gabapentin (NEURONTIN) 300 MG capsule; Take 2 capsules by mouth 2 times daily for 180 days. Intended supply: 30 days, Disp-120 capsule, R-1Normal        Counseling provided for:  >> Healthy eating - 1> avoid sugar and other refined carbohydrates; 2> eat more healthy unsaturated fats (runny at room temperature like oils and nut oils and fish oils and avocados); 3> eat more foods with fiber. >> Exercise - 1> try to do 150 minutes a week of exercise that is as hard as walking briskly (30 minutes 5 days a week or 22 minutes every day); and 2> do some strength training 2 or 3 times a week. Social support - Keep socially involved. This will help with keeping your brain working well (avoiding dementia) as you get older and also help you to be happier.  , Sleep hygeine - Get enough rest. Things that help are making sure the room is dark and cool, avoiding screen use for 1-2 hours before bedtime, having an unwinding routine. , Mentally activity - Keep trying to learn new things, reading, following sports/fashion/whatever you like, and other mental things to keep your brain working well and avoid dementia. and Hearing - If you have hearing trouble don't be afraid to get hearing aids. Hearing is important to help prevent mental decline. Recommended smoking cessation. The rxs's to [facility] in [place] are for him to get next month when working there. Return in about 3 months (around 3/7/2022) for pain and labs (a1c/lipids/hepc).      Brien Webb MD

## 2021-12-07 NOTE — ASSESSMENT & PLAN NOTE
I will prescribe gabapentin and ibuprofen with a month supply to be dispensed today here and with another prescription sent to a pharmacy in [place] where he can get it filled in about a month.

## 2021-12-07 NOTE — PATIENT INSTRUCTIONS
Referral sent to Dr. Lynn Borja in [address] [place] [address] to weight bear as tolerated  Continue range of motion  Ice and elevate as needed  Tylenol or Motrin for pain  Follow up as needed with Dr. Quiles Service

## 2021-12-07 NOTE — PROGRESS NOTES
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 The initial injury occurred in June 2021 when he fell off a roof and suffered a vertebral fracture as well as bilateral calcaneal injuries. He was seen in [facility] and had surgery for the bilateral calcaneus fractures by Dr. Miki Pham. Patient states that he did well after surgery but several months later did require some hardware removal.  He states he has had consistent ankle and calcaneus pain and since then. No new injury or complaints. He states he has a lot of pain with prolonged standing and climbing stairs. Again, no new injury or complaint. He was seen by podiatrist who recommended insoles for his plantar foot pain and he has not seen  [facility] since his most recent follow-up visit in October per the patient. He is here today for his first visit with me. The pain's location is bilateral calcaneus plantar area. he describes the symptoms as aching, sharp and stabbing. Symptoms improve with rest. Symptoms worsen with palpation, weight bearing (especially stair climbing). Patient states he denies having sensation of instability, decreased ROM    Treatment to date has been ice, NSAID without significant relief. Patient denies additional injury to lower extremities, denies numbness, tingling, fever, chills. Is affecting ADLs. Pain is 10/10 at it's worst.    Outside reports reviewed: none. Patient's medications, allergies, past medical, surgical, social and family histories were reviewed and updated as appropriate. MA HPI: Patient is a 39year old male. Patient is in the office today with bilateral heel pain. . Pain scale  9/10 on his right heel.  Pain scale 6/10 on his left heel. He has an ulcer on her left foot. His initial injury was 6/23/21 when he fell off of a roof. He had bilateral calcaneous ORIF. He had some hardware removed in September. He would like a second opinion for his heel pain. 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 History reviewed. No pertinent past medical history. History reviewed. No pertinent surgical history. History reviewed. No pertinent family history. Social History     Socioeconomic History    Marital status: Single     Spouse name: None    Number of children: None    Years of education: None    Highest education level: None   Occupational History    None   Tobacco Use    Smoking status: Current Every Day Smoker     Packs/day: 1.00     Types: Cigarettes    Smokeless tobacco: Former User     Types: Snuff   Vaping Use    Vaping Use: Never used   Substance and Sexual Activity    Alcohol use: Yes     Comment: rarely    Drug use: No    Sexual activity: None   Other Topics Concern    None   Social History Narrative    None     Social Determinants of Health     Financial Resource Strain: Low Risk     Difficulty of Paying Living Expenses: Not hard at all   Food Insecurity: No Food Insecurity    Worried About Running Out of Food in the Last Year: Never true    Glenda of Food in the Last Year: Never true   Transportation Needs: No Transportation Needs    Lack of Transportation (Medical): No    Lack of Transportation (Non-Medical):  No   Physical Activity:     Days of Exercise per Week: Not on file    Minutes of Exercise per Session: Not on file   Stress:     Feeling of Stress : Not on file   Social Connections:     Frequency of Communication with Friends and Family: Not on file    Frequency of Social Gatherings with Friends and Family: Not on file    Attends Sikh Services: Not on file    Active Member of Clubs or Organizations: Not on file    Attends Club or Organization Meetings: Not on file    Marital Status: Not on file   Intimate Partner Violence:     Fear of Current or Ex-Partner: Not on file    Emotionally Abused: Not on file    Physically Abused: Not on file    Sexually Abused: Not on file   Housing Stability:     Unable to Pay for Housing in the Last Year: Not on file    Number of [place] in the Last Year: Not on file    Unstable Housing in the Last Year: Not on file     Current Outpatient Medications   Medication Sig Dispense Refill    ibuprofen (ADVIL;MOTRIN) 600 MG tablet Take 1 tablet by mouth every 8 hours as needed for Pain 60 tablet 0    gabapentin (NEURONTIN) 300 MG capsule Take 2 capsules by mouth 2 times daily as needed (pain) for up to 30 days. Intended supply: 30 days 120 capsule 0    buPROPion (ZYBAN) 150 MG extended release tablet Take 1 tablet by mouth 2 times daily 60 tablet 0    nicotine (NICODERM CQ) 21 MG/24HR Place 1 patch onto the skin daily 42 patch 0    QUEtiapine (SEROQUEL XR) 300 MG extended release tablet Take 300 mg by mouth nightly (Patient not taking: Reported on 12/7/2021)       No current facility-administered medications for this visit. Allergies   Allergen Reactions    Risperidone Other (See Comments)     Makes pt feel like he has Parkinson           Review of Systems   Constitutional: Positive for activity change. Negative for fatigue and fever. HENT: Negative for sneezing, sore throat and voice change. Respiratory: Negative for cough, shortness of breath and wheezing. Cardiovascular: Negative for leg swelling. Gastrointestinal: Negative for nausea and vomiting. Musculoskeletal: Positive for arthralgias, joint swelling and myalgias. Negative for back pain, gait problem, neck pain and neck stiffness. Skin: Positive for wound. Negative for color change and rash. Neurological: Negative for weakness and numbness. Psychiatric/Behavioral: Negative for behavioral problems, confusion and self-injury. Examination:  General Exam:  Vitals: Pulse 87   Resp 16   Ht 5' 9\" (1.753 m)   Wt 179 lb (81.2 kg)   SpO2 98%   BMI 26.43 kg/m²    Physical Exam  Constitutional:       General: He is not in acute distress. Appearance: Normal appearance. He is normal weight. He is not ill-appearing. Eyes:      General:         Right eye: No discharge. Left eye: No discharge. Extraocular Movements: Extraocular movements intact. Cardiovascular:      Rate and Rhythm: Normal rate. Pulmonary:      Effort: Pulmonary effort is normal. No respiratory distress. Breath sounds: No wheezing. Musculoskeletal:         General: Swelling, tenderness and signs of injury present. Right knee: Normal.      Left knee: Normal.      Right lower leg: Normal. No edema. Left lower leg: Normal. No edema. Right ankle: Normal. No swelling, deformity, ecchymosis or lacerations. No tenderness. No lateral malleolus tenderness. Normal range of motion. Anterior drawer test negative. Normal pulse. Right Achilles Tendon: Normal.      Left ankle: Normal.      Left Achilles Tendon: Normal.      Right foot: Decreased range of motion. Normal capillary refill. Swelling, deformity, tenderness and bony tenderness present. No laceration or crepitus. Normal pulse. Left foot: Decreased range of motion. Normal capillary refill. Swelling, deformity, tenderness and bony tenderness present. No laceration or crepitus. Normal pulse. Skin:     General: Skin is warm and dry. Capillary Refill: Capillary refill takes less than 2 seconds. Findings: Lesion present. Neurological:      General: No focal deficit present. Mental Status: He is alert and oriented to person, place, and time. Sensory: No sensory deficit. varus-valgus)    Stable      PROVOCATIVE TESTING:    Forced DF/ER: No pain at syndesmosis. Mid-leg squeeze No pain at syndesmosis    Forced DF: No pain anterior joint line. Forced PF: No pain posterior ankle. Forced INV: No pain present laterally    Forced EV: Mild pain laterally at inferior lateral malleolus      Mccalls sign: Normal ankle plantar flexion. Resisted peroneal No subluxation or pain    1st-2nd MT toggle No pain at Lisfranc    MT-T torque No pain at Lisfranc      NEUROLOGIC TESTING:    All dermatomes foot, ankle and leg have normal sensation light touch    Ankle Reflexes 2+, symmetric     Negative Babinski and No Clonus      VASCULAR:  2+ pulses PT/DT with brisk capillary refill toes. Diagnostic testing:  X-ray images were reviewed by myself and discussed with the patient:  3 views of the right foot in a skeletally mature patient demonstrates previous calcaneus fracture with screw fixation. No failure of hardware. Slight loss of calcaneal height. No sign of any additional osseous abnormalities. No obvious soft tissue avulsion type injury. Prominent screws at plantar surface. 3 views of the left foot in a skeletally mature patient demonstrates previous calcaneus fracture with screw fixation. No failure of hardware. Mild loss of calcaneal height. No sign of any additional osseous abnormalities. No obvious soft tissue avulsion type injury. Minimal prominence of screws at posterior inferior portion of calcaneus. Office Procedures:  No orders of the defined types were placed in this encounter. Assessment and Plan    A: Bilateral calcaneus pain    P:   I had a thorough conversation with the patient regarding his bilateral heel pain. I explained that he does have some prominence of the calcaneal screws on the left with minimal prominence in the right. He also has some loss of calcaneal height which is commonly seen after such a high energy fracture.   I explained that insoles will not hurt his calcaneus issues but I feel that he likely needs to follow-up with Dr. Kenney Crowder for further evaluation and possible additional procedures which may include hardware removal.  Patient can return activities as tolerated. He should continue to use ice and over-the-counter anti-inflammatories for pain control. I explained I am happy to see him in the future for any new or worsening issues. Otherwise, patient will be seen as needed.       Electronically signed by Jp Cole DO on 12/7/2021 at 9:11 AM

## 2021-12-07 NOTE — PATIENT INSTRUCTIONS
Advance Directives: Care Instructions  Overview  An advance directive is a legal way to state your wishes at the end of your life. It tells your family and your doctor what to do if you can't say what you want. There are two main types of advance directives. You can change them any time your wishes change. Living will. This form tells your family and your doctor your wishes about life support and other treatment. The form is also called a declaration. Medical power of . This form lets you name a person to make treatment decisions for you when you can't speak for yourself. This person is called a health care agent (health care proxy, health care surrogate). The form is also called a durable power of  for health care. If you do not have an advance directive, decisions about your medical care may be made by a family member, or by a doctor or a  who doesn't know you. It may help to think of an advance directive as a gift to the people who care for you. If you have one, they won't have to make tough decisions by themselves. Follow-up care is a key part of your treatment and safety. Be sure to make and go to all appointments, and call your doctor if you are having problems. It's also a good idea to know your test results and keep a list of the medicines you take. What should you include in an advance directive? Many states have a unique advance directive form. (It may ask you to address specific issues.) Or you might use a universal form that's approved by many states. If your form doesn't tell you what to address, it may be hard to know what to include in your advance directive. Use the questions below to help you get started. · Who do you want to make decisions about your medical care if you are not able to? · What life-support measures do you want if you have a serious illness that gets worse over time or can't be cured? · What are you most afraid of that might happen? (Maybe you're afraid of having pain, losing your independence, or being kept alive by machines.)  · Where would you prefer to die? (Your home? A hospital? A nursing home?)  · Do you want to donate your organs when you die? · Do you want certain [employer] practices performed before you die? When should you call for help? Be sure to contact your doctor if you have any questions. Where can you learn more? Go to https://Intellijoulepepiceweb.BALALIKEA. org and sign in to your [employer] account. Enter R264 in the uberVU box to learn more about \"Advance Directives: Care Instructions. \"     If you do not have an account, please click on the \"Sign Up Now\" link. Current as of: March 17, 2021               Content Version: 13.0  © 9175-2800 Healthwise, OzVision. Care instructions adapted under license by [facility] ([facility]). If you have questions about a medical condition or this instruction, always ask your healthcare professional. [place] [address] any warranty or liability for your use of this information. Stopping Smoking: Care Instructions  Your Care Instructions     Cigarette smokers crave the nicotine in cigarettes. Giving it up is much harder than simply changing a habit. Your body has to stop craving the nicotine. It is hard to quit, but you can do it. There are many tools that people use to quit smoking. You may find that combining tools works best for you. There are several steps to quitting. First you get ready to quit. Then you get support to help you. After that, you learn new skills and behaviors to become a nonsmoker. For many people, a necessary step is getting and using medicine. Your doctor will help you set up the plan that best meets your needs. You may want to attend a smoking cessation program to help you quit smoking. When you choose a program, look for one that has proven success. Ask your doctor for ideas.  You will greatly increase your chances of success if you take medicine as well as get counseling or join a cessation program.  Some of the changes you feel when you first quit tobacco are uncomfortable. Your body will miss the nicotine at first, and you may feel short-tempered and grumpy. You may have trouble sleeping or concentrating. Medicine can help you deal with these symptoms. You may struggle with changing your smoking habits and rituals. The last step is the tricky one: Be prepared for the smoking urge to continue for a time. This is a lot to deal with, but keep at it. You will feel better. Follow-up care is a key part of your treatment and safety. Be sure to make and go to all appointments, and call your doctor if you are having problems. It's also a good idea to know your test results and keep a list of the medicines you take. How can you care for yourself at home? · Ask your family, friends, and coworkers for support. You have a better chance of quitting if you have help and support. · Join a support group, such as Nicotine Anonymous, for people who are trying to quit smoking. · Consider signing up for a smoking cessation program, such as the American Lung Association's Freedom from Smoking program.  · Get text messaging support. Go to the website at www.smokefree. gov to sign up for the [facility] program.  · Set a quit date. Pick your date carefully so that it is not right in the middle of a big deadline or stressful time. Once you quit, do not even take a puff. Get rid of all ashtrays and lighters after your last cigarette. Clean your house and your clothes so that they do not smell of smoke. · Learn how to be a nonsmoker. Think about ways you can avoid those things that make you reach for a cigarette. ? Avoid situations that put you at greatest risk for smoking. For some people, it is hard to have a drink with friends without smoking. For others, they might skip a coffee break with coworkers who smoke. ? Change your daily routine.  Take a different route to work or eat a meal in a different place. · Cut down on stress. Calm yourself or release tension by doing an activity you enjoy, such as reading a book, taking a hot bath, or gardening. · Talk to your doctor or pharmacist about nicotine replacement therapy, which replaces the nicotine in your body. You still get nicotine but you do not use tobacco. Nicotine replacement products help you slowly reduce the amount of nicotine you need. These products come in several forms, many of them available over-the-counter:  ? Nicotine patches  ? Nicotine gum and lozenges  ? Nicotine inhaler  · Ask your doctor about bupropion (Wellbutrin) or varenicline (Chantix), which are prescription medicines. They do not contain nicotine. They help you by reducing withdrawal symptoms, such as stress and anxiety. · Some people find hypnosis, acupuncture, and massage helpful for ending the smoking habit. · Eat a healthy diet and get regular exercise. Having healthy habits will help your body move past its craving for nicotine. · Be prepared to keep trying. Most people are not successful the first few times they try to quit. Do not get mad at yourself if you smoke again. Make a list of things you learned and think about when you want to try again, such as next week, next month, or next year. Where can you learn more? Go to https://Food Reporter.The Green Office. org and sign in to your [employer] account. Enter S805 in the [facility] box to learn more about \"Stopping Smoking: Care Instructions. \"     If you do not have an account, please click on the \"Sign Up Now\" link. Current as of: February 11, 2021               Content Version: 13.0  © 0441-0886 Healthwise, [employer]. Care instructions adapted under license by [facility] ([facility]).  If you have questions about a medical condition or this instruction, always ask your healthcare professional. Walt Acosta any warranty or liability for your use of this information. Well Visit, Ages 25 to 48: Care Instructions  Overview     Well visits can help you stay healthy. Your doctor has checked your overall health and may have suggested ways to take good care of yourself. Your doctor also may have recommended tests. At home, you can help prevent illness with healthy eating, regular exercise, and other steps. Follow-up care is a key part of your treatment and safety. Be sure to make and go to all appointments, and call your doctor if you are having problems. It's also a good idea to know your test results and keep a list of the medicines you take. How can you care for yourself at home? · Get screening tests that you and your doctor decide on. Screening helps find diseases before any symptoms appear. · Eat healthy foods. Choose fruits, vegetables, whole grains, protein, and low-fat dairy foods. Limit fat, especially saturated fat. Reduce salt in your diet. · Limit alcohol. If you are a man, have no more than 2 drinks a day or 14 drinks a week. If you are a woman, have no more than 1 drink a day or 7 drinks a week. · Get at least 30 minutes of physical activity on most days of the week. Walking is a good choice. You also may want to do other activities, such as running, swimming, cycling, or playing tennis or team sports. Discuss any changes in your exercise program with your doctor. · Reach and stay at a healthy weight. This will lower your risk for many problems, such as obesity, diabetes, heart disease, and high blood pressure. · Do not smoke or allow others to smoke around you. If you need help quitting, talk to your doctor about stop-smoking programs and medicines. These can increase your chances of quitting for good. · Care for your mental health. It is easy to get weighed down by worry and stress. Learn strategies to manage stress, like deep breathing and mindfulness, and stay connected with your family and community.  If you find you often feel sad or hopeless, talk with your doctor. Treatment can help. · Talk to your doctor about whether you have any risk factors for sexually transmitted infections (STIs). You can help prevent STIs if you wait to have sex with a new partner (or partners) until you've each been tested for STIs. It also helps if you use condoms (male or female condoms) and if you limit your sex partners to one person who only has sex with you. Vaccines are available for some STIs, such as HPV. · Use birth control if it's important to you to prevent pregnancy. Talk with your doctor about the choices available and what might be best for you. · If you think you may have a problem with alcohol or drug use, talk to your doctor. This includes prescription medicines (such as amphetamines and opioids) and illegal drugs (such as cocaine and methamphetamine). Your doctor can help you figure out what type of treatment is best for you. · Protect your skin from too much sun. When you're outdoors from 10 a.m. to 4 p.m., stay in the shade or cover up with clothing and a hat with a wide brim. Wear sunglasses that block UV rays. Even when it's cloudy, put broad-spectrum sunscreen (SPF 30 or higher) on any exposed skin. · See a dentist one or two times a year for checkups and to have your teeth cleaned. · Wear a seat belt in the car. When should you call for help? Watch closely for changes in your health, and be sure to contact your doctor if you have any problems or symptoms that concern you. Where can you learn more? Go to https://sally.healthOkairospartners. org and sign in to your [employer] account. Enter P072 in the [facility] box to learn more about \"Well Visit, Ages 25 to 48: Care Instructions. \"     If you do not have an account, please click on the \"Sign Up Now\" link. Current as of: February 11, 2021               Content Version: 13.0  © 4309-3138 Healthwise, Incorporated.    Care instructions adapted under license by [facility] ([facility]). If you have questions about a medical condition or this instruction, always ask your healthcare professional. [address] any warranty or liability for your use of this information. Learning About Changing a Habit by Setting Goals  How can you change a habit? If you've decided to change a habitwhether it's quitting smoking, lowering your blood pressure, becoming more active, or doing something else to improve your healthcongratulations! Making that decision is the first step toward making a change. What happens next? Have a reason. Set goals you can reach. Prepare for slip-ups. And get support. What's your reason? Your reason for wanting to change a habit is really important. Maybe you want to quit smoking so that you can avoid future health problems. Or maybe you want to eat a healthier diet so you can lose weight. If you have high blood pressure, your reason may be clear: to lower your blood pressure. Maybe you smoke and want to save money on cigarettes. You need to feel ready to make a change. If you don't feel ready now, that's okay. You can still be thinking and planning. When you truly want to make changes, you're ready for the next step. It's not easy to change habitsbut you can do it. Taking the time to really think about what will motivate or inspire you will help you reach your goals. How do you set goals? Setting goals can help a lot when you're trying to make a healthy change. · Focus on small goals. This will help you reach larger goals over time. With smaller goals, you'll have success more often, which will help you stay with it. For example, your large goal may be to lose 20 pounds. Your small goal could be to lose 5.  · Write down your goals. This will help you remember, and you'll have a clearer idea of what you want to achieve. Use a journal or notebook to record your goals.  Hang up your plan where you will see it often as a reminder of what you're trying to do. · Make your goals specific. Specific goals help you measure your progress. For example, setting a goal to eat one extra serving of vegetables a day is better than a general goal to \"eat more vegetables. \"  · Focus on one goal at a time. By doing this, you're less likely to feel overwhelmed and then give up. · When you reach a goal, reward yourself. Celebrate your new behavior and success for several days, and then think about setting your next goal.  How can you prepare for slip-ups? It's perfectly normal to try to change a habit, go along fine for a while, and then have a setback. Lots of people try and try again before they reach their goals. What are the things that might cause a setback for you? If you have tried to change a habit before, think about what helped you and what got in your way. By thinking about these barriers now, you can plan ahead for how to deal with them if they happen. There will be times when you slip up and don't make your goal for the week. When that happens, don't get mad at yourself. Learn from the experience. Ask yourself what got in the way of reaching your goal. Positive thinking goes a long way when you're making lifestyle changes. How can you get support? · Get a partner. It's motivating to know that someone is trying to make the same change that you're making, like being more active or changing your eating habits. You have someone who is counting on you to help them succeed. That person can also remind you how far you've come. · Get friends and family involved. They can exercise with you. Or they can encourage you by saying how they admire what you are doing. Family members can join you in your healthy eating efforts. Don't be afraid to tell family and friends that their encouragement makes a big difference to you. · Join a class or support group. People in these groups often have some of the same barriers you have.  They can give you support when you don't feel like staying with your plan. They can boost your morale when you need a lift. Elke Gins also find a number of online support groups. · Encourage yourself. When you feel like giving up, don't waste energy feeling bad about yourself. Remember your reason for wanting to change, think about the progress you've made, and give yourself a pep talk and a pat on the back. · Get professional help. A dietitian can help you make your diet healthier while still allowing you to eat foods that you enjoy. A  or physical therapist can help design an exercise program that is fun and easy to stay on. A counselor, a , or your doctor can help you overcome hurdles, reduce stress, or quit smoking. Where can you learn more? Go to https://Trenergichelieb.Futurefleet. org and sign in to your "[employer]" account. Enter H374 in the [employer] box to learn more about \"Learning About Changing a Habit by Setting Goals. \"     If you do not have an account, please click on the \"Sign Up Now\" link. Current as of: June 16, 2021               Content Version: 13.0  © 6397-9795 HealthGoodwell, Incorporated. Care instructions adapted under license by [facility] ([facility]). If you have questions about a medical condition or this instruction, always ask your healthcare professional. [address] any warranty or liability for your use of this information.

## 2021-12-07 NOTE — PROGRESS NOTES
Patient is a 39year old male. Patient is in the office today with bilateral heel pain. . Pain scale  9/10 on his right heel. Pain scale 6/10 on his left heel. He has an ulcer on her left foot. His initial injury was 6/23/21 when he fell off of a roof. He had bilateral calcaneous ORIF. He had some hardware removed in September. He would like a second opinion for his heel pain.    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

## 2021-12-07 NOTE — ASSESSMENT & PLAN NOTE
He is willing to use nicotine patches and Zyban in an effort to quit smoking. I sent the 1 month refill to [place] where he can get it in a month since he will be working in [place].

## 2021-12-08 NOTE — PROGRESS NOTES
He was scheduled in the morning but did not make the appt. he was rescheduled at 1 PM.  I just looked on yesterday's schedule and saw that the note for him at 1 PM does have usual office notes for an annual preventive visit plus an E and M visit for his heel, does have level of service charges, and that there did not appear to be any other problems with it. I think he somehow got 2 office visits in the same day and so this must be the one that he did not come to. I think it is marked as a no-show or a canceled visit on the appointment. There should not be 2 sets of charges to him and there do not need to be 2 notes except for what I am dictating right now to explain what happened.

## 2021-12-21 ENCOUNTER — OFFICE VISIT (OUTPATIENT)
Dept: FAMILY MEDICINE CLINIC | Age: 46
End: 2021-12-21
Payer: MEDICAID

## 2021-12-21 ENCOUNTER — HOSPITAL ENCOUNTER (OUTPATIENT)
Age: 46
Setting detail: SPECIMEN
Discharge: HOME | End: 2021-12-21
Payer: MEDICAID

## 2021-12-21 VITALS — HEART RATE: 80 BPM | OXYGEN SATURATION: 97 %

## 2021-12-21 DIAGNOSIS — R09.81 NASAL CONGESTION: Primary | ICD-10-CM

## 2021-12-21 DIAGNOSIS — H65.03 NON-RECURRENT ACUTE SEROUS OTITIS MEDIA OF BOTH EARS: ICD-10-CM

## 2021-12-21 PROCEDURE — G8427 DOCREV CUR MEDS BY ELIG CLIN: HCPCS | Performed by: NURSE PRACTITIONER

## 2021-12-21 PROCEDURE — 99213 OFFICE O/P EST LOW 20 MIN: CPT | Performed by: NURSE PRACTITIONER

## 2021-12-21 PROCEDURE — U0005 INFEC AGEN DETEC AMPLI PROBE: HCPCS

## 2021-12-21 PROCEDURE — G8484 FLU IMMUNIZE NO ADMIN: HCPCS | Performed by: NURSE PRACTITIONER

## 2021-12-21 PROCEDURE — 4004F PT TOBACCO SCREEN RCVD TLK: CPT | Performed by: NURSE PRACTITIONER

## 2021-12-21 PROCEDURE — U0003 INFECTIOUS AGENT DETECTION BY NUCLEIC ACID (DNA OR RNA); SEVERE ACUTE RESPIRATORY SYNDROME CORONAVIRUS 2 (SARS-COV-2) (CORONAVIRUS DISEASE [COVID-19]), AMPLIFIED PROBE TECHNIQUE, MAKING USE OF HIGH THROUGHPUT TECHNOLOGIES AS DESCRIBED BY CMS-2020-01-R: HCPCS

## 2021-12-21 PROCEDURE — G8419 CALC BMI OUT NRM PARAM NOF/U: HCPCS | Performed by: NURSE PRACTITIONER

## 2021-12-21 NOTE — PROGRESS NOTES
12/21/21  Brad Serum  1975    FLU/COVID-19 CLINIC EVALUATION    HPI SYMPTOMS:    Employer:[employer]    [] Fevers -yesterday  [] Chills  [] Cough  [] Coughing up blood  [] Chest Congestion  [x] Nasal Congestion  [] Feeling short of breath  [] Sometimes  [] Frequently  [] All the time  [] Chest pain  [] Headaches  []Tolerable  [] Severe  [] Sore throat  [] Muscle aches-resolved  [] Nausea  [] Vomiting  []Unable to keep fluids down  [] Diarrhea  []Severe    [] OTHER SYMPTOMS:      Symptom Duration:   [] 1  [] 2   [] 3   [] 4    [] 5   [] 6   [] 7   [] 8   [] 9   [] 10   [] 11   [] 12   [] 13   [] 14   [x] Longer than 14 days    Symptom course:   [] Worsening     [x] Stable     [] Improving    RISK FACTORS:    [] Pregnant or possibly pregnant  [] Age over 61  [] Diabetes  [] Heart disease  [] Asthma  [] COPD/Other chronic lung diseases  [] Active Cancer  [] On Chemotherapy  [] Taking oral steroids  [] History Lymphoma/Leukemia  [] Close contact with a lab confirmed COVID-19 patient within 14 days of symptom onset  [] History of travel from affected geographical areas within 14 days of symptom onset       VITALS:  There were no vitals filed for this visit. TESTS:    POCT FLU:  [] Positive     []Negative    ASSESSMENT:    [] Flu  [] Possible COVID-19  [] Strep    PLAN:    [] Discharge home with written instructions for:  [] Flu management  [] Possible COVID-19 infection with self-quarantine and management of symptoms  [] Follow-up with primary care physician or emergency department if worsens  [] Evaluation per physician/NP/PA in clinic  [] Sent to ER       An  electronic signature was used to authenticate this note.      --Sebastian Garza MA on 12/21/2021 at 10:06 AM

## 2021-12-21 NOTE — PATIENT INSTRUCTIONS
Your COVID 19 test can take 1-5 days for the results to come back. We ask that you make a Mychart page and view your test results this way. Generally test results have been back in 24hrs. You will need to Self quarantine until you know your results. Recommend the following to enhance your immune system-    Vitamin D3 5,000 IU daily. Can continue on 2,000-5,000 IU after illness has resolved. Vitamin C 1,000mg twice daily. Can continue vitamin C 1,000-2,000mg daily for health maintenance once illness has resolved. Zinc 50mg daily. Drop down to 15-25mg daily for general respiratory virus prevention once illness has resolved. Increase fluids and rest  Saline nasal spray as needed for nasal congestion  Flonase nasal spray over the counter may be helpful for the fluid build-up behind your eardrum in your right ear - 2 sprays per nostril once daily. Warm salt gargles as needed for throat discomfort  Monitor temperature twice a day  Tylenol as needed for fevers and/or discomfort. Big deep breaths periodically throughout the day  Regular Mucinex over the counter as needed for chest congestion  Follow up with your primary care provider if no improvement in symptoms in 3-5 days. To Whom it May Concern:    Oxana Treadwell was tested for COVID-19 12/21/2021. He/she must stay home until test results are back. If test is positive, he/she must quarantine for a total of 10 days starting from day one of symptom onset. He/she must also be fever-free for 24 hours at that time, and also have improvement in symptoms. We do not recommend retesting as patients may continue to test positive for months even though no longer contagious. It is suggested you call [email or web address] or [email or web address] with any questions regarding quarantine timeframe/return to work/school details.

## 2021-12-22 LAB
SARS-COV-2: DETECTED
SOURCE: ABNORMAL

## 2021-12-27 NOTE — PROGRESS NOTES
12/21/2021    HPI:  Chief complaint and history of present illness as per medical assistant/nurse documented today in the Flu/COVID-19 clinic. Ashish Nolan is a 48yo M established patient who presents re: upper resp symptoms x >14 days. Mostly nasal congestion + drainage + ear fullness bilaterally. Initially felt feverish. Otherwise no fevers, chills, myalgias, headaches, sore throat, cough, nausea, vomiting, diarrhea, rash, loss of taste/smell. He lives with an older lady who asked for him to be tested for COVID. He is a current everyday cigarette smoker. Pfizer vaccine in Sept + Oct 21'. MEDICATIONS:  Prior to Visit Medications    Medication Sig Taking? Authorizing Provider   buPROPion (ZYBAN) 150 MG extended release tablet Take 1 tablet by mouth 2 times daily  Jose Carbajal MD   ibuprofen (ADVIL;MOTRIN) 600 MG tablet Take 1 tablet by mouth every 8 hours as needed for Pain  Jose Carbajal MD   gabapentin (NEURONTIN) 300 MG capsule Take 2 capsules by mouth 3 times daily for 30 days. Intended supply: 30 days  Jose Carbajal MD   nicotine (NICODERM CQ) 21 MG/24HR Place 1 patch onto the skin daily  Jose Carbajal MD   nicotine (NICODERM CQ) 21 MG/24HR Place 1 patch onto the skin daily  Jose Carbajal MD   buPROPion (ZYBAN) 150 MG extended release tablet Take 1 tablet by mouth 2 times daily  Jose Carbajal MD   ibuprofen (ADVIL;MOTRIN) 600 MG tablet Take 1 tablet by mouth every 8 hours as needed for Pain  Jose Carbajal MD   gabapentin (NEURONTIN) 300 MG capsule Take 2 capsules by mouth 2 times daily for 180 days. Intended supply: 30 days  Jose Carbajal MD       Allergies   Allergen Reactions    Risperidone Other (See Comments)     Makes pt feel like he has Parkinson      History reviewed. No pertinent past medical history. , History reviewed. No pertinent surgical history. PHYSICAL EXAM:  Physical Exam  Constitutional:       Appearance: Normal appearance.  He is not toxic-appearing. HENT:      Head: Normocephalic and atraumatic. Right Ear: Ear canal and external ear normal. A middle ear effusion is present. Tympanic membrane is not erythematous or bulging. Left Ear: Ear canal and external ear normal. A middle ear effusion is present. Tympanic membrane is not erythematous or bulging. Nose: Nose normal.      Mouth/Throat:      Mouth: Mucous membranes are moist.      Pharynx: Oropharynx is clear. No oropharyngeal exudate or posterior oropharyngeal erythema. Eyes:      Extraocular Movements: Extraocular movements intact. Conjunctiva/sclera: Conjunctivae normal.   Cardiovascular:      Rate and Rhythm: Normal rate and regular rhythm. Heart sounds: Normal heart sounds. Pulmonary:      Effort: Pulmonary effort is normal.      Breath sounds: Normal breath sounds. Musculoskeletal:         General: Normal range of motion. Cervical back: Neck supple. Skin:     General: Skin is warm. Neurological:      General: No focal deficit present. Mental Status: He is alert and oriented to person, place, and time. Psychiatric:         Mood and Affect: Mood normal.         Behavior: Behavior normal.     ASSESSMENT/PLAN:  1. Nasal congestion  2. Non-recurrent acute serous otitis media of both ears    COVID PCR obtained today per patient's request. Will call him w/ results. He is aware that he is unlikely to be contagious at this point as he is reporting symptoms for >14 days. Discussed the following:     Recommend the following to enhance your immune system-    Vitamin D3 5,000 IU daily. Can continue on 2,000-5,000 IU after illness has resolved. Vitamin C 1,000mg twice daily. Can continue vitamin C 1,000-2,000mg daily for health maintenance once illness has resolved. Zinc 50mg daily. Drop down to 15-25mg daily for general respiratory virus prevention once illness has resolved.      Increase fluids and rest  Saline nasal spray as needed for nasal congestion  Flonase nasal spray over the counter may be helpful for the fluid build-up behind your eardrum in your right ear - 2 sprays per nostril once daily. Warm salt gargles as needed for throat discomfort  Monitor temperature twice a day  Tylenol as needed for fevers and/or discomfort. Big deep breaths periodically throughout the day  Regular Mucinex over the counter as needed for chest congestion  Follow up with your primary care provider if no improvement in symptoms in 3-5 days. Zee Gu indicates understanding and is agreeable to plan. FOLLOW-UP:  Return if symptoms worsen or fail to improve.     In addition to other information, the printed after visit summary provided to the patient includes:  [x] COVID-19 Self care instructions  [x] COVID-19 General patient information

## 2022-03-30 DIAGNOSIS — S92.001D CLOSED FRACTURE OF BOTH CALCANEI WITH ROUTINE HEALING, SUBSEQUENT ENCOUNTER: ICD-10-CM

## 2022-03-30 DIAGNOSIS — S92.002D CLOSED FRACTURE OF BOTH CALCANEI WITH ROUTINE HEALING, SUBSEQUENT ENCOUNTER: ICD-10-CM

## 2022-03-30 RX ORDER — GABAPENTIN 300 MG/1
600 CAPSULE ORAL 3 TIMES DAILY PRN
Qty: 180 CAPSULE | Refills: 1 | Status: SHIPPED | OUTPATIENT
Start: 2022-03-30 | End: 2022-05-29

## 2022-03-30 RX ORDER — IBUPROFEN 600 MG/1
600 TABLET ORAL EVERY 8 HOURS PRN
Qty: 90 TABLET | Refills: 1 | Status: SHIPPED | OUTPATIENT
Start: 2022-03-30

## 2022-03-30 NOTE — TELEPHONE ENCOUNTER
Patient called stating he needs a refill of his gabapentin and ibuprofen. He is requesting 2 months worth of medication because he can't come in until June because of work.

## 2022-04-19 ENCOUNTER — TELEPHONE (OUTPATIENT)
Dept: FAMILY MEDICINE CLINIC | Age: 47
End: 2022-04-19

## 2022-04-19 DIAGNOSIS — S92.002S CLOSED BILATERAL CALCANEAL FRACTURES, SEQUELA: ICD-10-CM

## 2022-04-19 DIAGNOSIS — S92.001S CLOSED BILATERAL CALCANEAL FRACTURES, SEQUELA: ICD-10-CM

## 2022-04-19 DIAGNOSIS — S22.001D CLOSED BURST FRACTURE OF THORACIC VERTEBRA WITH ROUTINE HEALING, SUBSEQUENT ENCOUNTER: ICD-10-CM

## 2022-04-19 DIAGNOSIS — Z98.890 S/P HARDWARE REMOVAL: ICD-10-CM

## 2022-04-19 DIAGNOSIS — S32.000D COMPRESSION FRACTURE OF LUMBAR VERTEBRA WITH ROUTINE HEALING, UNSPECIFIED LUMBAR VERTEBRAL LEVEL, SUBSEQUENT ENCOUNTER: Primary | ICD-10-CM

## 2022-04-19 DIAGNOSIS — S92.001D CLOSED FRACTURE OF BOTH CALCANEI WITH ROUTINE HEALING, SUBSEQUENT ENCOUNTER: ICD-10-CM

## 2022-04-19 DIAGNOSIS — S92.002D CLOSED FRACTURE OF BOTH CALCANEI WITH ROUTINE HEALING, SUBSEQUENT ENCOUNTER: ICD-10-CM

## 2022-04-19 NOTE — TELEPHONE ENCOUNTER
Patient informed of referral being placed to office that he requested. Patient expressed gratitude .

## 2022-07-12 ENCOUNTER — HOSPITAL ENCOUNTER (EMERGENCY)
Age: 47
Discharge: HOME | End: 2022-07-13
Attending: EMERGENCY MEDICINE
Payer: MEDICAID

## 2022-07-12 DIAGNOSIS — M79.672 LEFT FOOT PAIN: Primary | ICD-10-CM

## 2022-07-12 PROCEDURE — 99283 EMERGENCY DEPT VISIT LOW MDM: CPT

## 2022-07-13 ENCOUNTER — APPOINTMENT (OUTPATIENT)
Dept: GENERAL RADIOLOGY | Age: 47
End: 2022-07-13
Payer: MEDICAID

## 2022-07-13 VITALS
SYSTOLIC BLOOD PRESSURE: 111 MMHG | OXYGEN SATURATION: 97 % | DIASTOLIC BLOOD PRESSURE: 83 MMHG | HEART RATE: 81 BPM | RESPIRATION RATE: 16 BRPM | WEIGHT: 165 LBS | HEIGHT: 71 IN | TEMPERATURE: 97.8 F | BODY MASS INDEX: 23.1 KG/M2

## 2022-07-13 PROCEDURE — 73630 X-RAY EXAM OF FOOT: CPT

## 2022-07-13 ASSESSMENT — ENCOUNTER SYMPTOMS
EYE DISCHARGE: 0
EYE PAIN: 0
NAUSEA: 0
ABDOMINAL PAIN: 0
BACK PAIN: 0
SHORTNESS OF BREATH: 0
RHINORRHEA: 0
SORE THROAT: 0
COUGH: 0

## 2022-07-13 NOTE — ED PROVIDER NOTES
breath. Cardiovascular: Negative for chest pain and palpitations. Gastrointestinal: Negative for abdominal pain and nausea. Endocrine: Negative for polydipsia and polyuria. Genitourinary: Negative for dysuria and flank pain. Musculoskeletal: Negative for back pain. Foot pain   Skin: Negative for pallor and wound. Neurological: Negative for dizziness, facial asymmetry, light-headedness, numbness and headaches. Psychiatric/Behavioral: Negative for confusion. Except as noted above the remainder of the review of systems was reviewed and negative. PAST MEDICAL HISTORY   History reviewed. No pertinent past medical history. Prior to Admission medications    Medication Sig Start Date End Date Taking? Authorizing Provider   gabapentin (NEURONTIN) 300 MG capsule Take 2 capsules by mouth 3 times daily as needed (pain) for up to 60 days. But reduce dose as you are able to reduce it. 3/30/22 5/29/22  Jennifer So MD   ibuprofen (ADVIL;MOTRIN) 600 MG tablet Take 1 tablet by mouth every 8 hours as needed for Pain 3/30/22   Jennifer So MD   buPROPion [facility]) 150 MG extended release tablet Take 1 tablet by mouth 2 times daily 12/7/21   Jennifer So MD   nicotine (NICODERM CQ) 21 MG/24HR Place 1 patch onto the skin daily 12/7/21 1/18/22  Jennifer So MD   nicotine (NICODERM CQ) 21 MG/24HR Place 1 patch onto the skin daily 12/7/21 1/18/22  Jennifer So MD   buPROPion [facility]) 150 MG extended release tablet Take 1 tablet by mouth 2 times daily 12/7/21   Jennifer So MD   ibuprofen (ADVIL;MOTRIN) 600 MG tablet Take 1 tablet by mouth every 8 hours as needed for Pain 12/7/21   Jennifer So MD   gabapentin (NEURONTIN) 300 MG capsule Take 2 capsules by mouth 2 times daily for 180 days.  Intended supply: 30 days 1/6/22 7/5/22  Jennifer So MD        Patient Active Problem List   Diagnosis    Bilateral calcaneal fractures    Closed burst fracture of thoracic vertebra ([address]    Compression fracture of lumbar vertebra (HCC)    Psychosis (HCC)    S/P hardware removal    Smoker    Closed bilateral calcaneal fractures, sequela         SURGICAL HISTORY       Past Surgical History:   Procedure Laterality Date    FOOT SURGERY Bilateral     SPINE SURGERY           CURRENT MEDICATIONS       Previous Medications    BUPROPION (ZYBAN) 150 MG EXTENDED RELEASE TABLET    Take 1 tablet by mouth 2 times daily    BUPROPION (ZYBAN) 150 MG EXTENDED RELEASE TABLET    Take 1 tablet by mouth 2 times daily    GABAPENTIN (NEURONTIN) 300 MG CAPSULE    Take 2 capsules by mouth 2 times daily for 180 days. Intended supply: 30 days    GABAPENTIN (NEURONTIN) 300 MG CAPSULE    Take 2 capsules by mouth 3 times daily as needed (pain) for up to 60 days. But reduce dose as you are able to reduce it. IBUPROFEN (ADVIL;MOTRIN) 600 MG TABLET    Take 1 tablet by mouth every 8 hours as needed for Pain    IBUPROFEN (ADVIL;MOTRIN) 600 MG TABLET    Take 1 tablet by mouth every 8 hours as needed for Pain    NICOTINE (NICODERM CQ) 21 MG/24HR    Place 1 patch onto the skin daily    NICOTINE (NICODERM CQ) 21 MG/24HR    Place 1 patch onto the skin daily       ALLERGIES     Risperidone    FAMILY HISTORY     History reviewed. No pertinent family history.        SOCIAL HISTORY       Social History     Socioeconomic History    Marital status: Single     Spouse name: None    Number of children: None    Years of education: None    Highest education level: None   Occupational History    None   Tobacco Use    Smoking status: Current Every Day Smoker     Packs/day: 1.00     Types: Cigarettes    Smokeless tobacco: Former User     Types: Snuff   Vaping Use    Vaping Use: Never used   Substance and Sexual Activity    Alcohol use: Yes     Comment: rarely    Drug use: No    Sexual activity: None   Other Topics Concern    None   Social History Narrative    None     Social Determinants of Health     Financial Resource Strain: Low Risk     Difficulty of Paying Living Expenses: Not hard at all   Food Insecurity: No Food Insecurity    Worried About Running Out of Food in the Last Year: Never true    Glenda of Food in the Last Year: Never true   Transportation Needs: No Transportation Needs    Lack of Transportation (Medical): No    Lack of Transportation (Non-Medical): No   Physical Activity:     Days of Exercise per Week: Not on file    Minutes of Exercise per Session: Not on file   Stress:     Feeling of Stress : Not on file   Social Connections:     Frequency of Communication with Friends and Family: Not on file    Frequency of Social Gatherings with Friends and Family: Not on file    Attends [employer] Services: Not on file    Active Member of [address] [place] [address] [employer] or Organizations: Not on file    Attends Club or Organization Meetings: Not on file    Marital Status: Not on file   Intimate Partner Violence:     Fear of Current or Ex-Partner: Not on file    Emotionally Abused: Not on file    Physically Abused: Not on file    Sexually Abused: Not on file   Housing Stability:     Unable to Pay for Housing in the Last Year: Not on file    Number of [place] in the Last Year: Not on file    Unstable Housing in the Last Year: Not on file       SCREENINGS    Paige Coma Scale  Eye Opening: Spontaneous  Best Verbal Response: Oriented  Best Motor Response: Obeys commands  Paige Coma Scale Score: 15          PHYSICAL EXAM    (up to 7 for level 4, 8 or more for level 5)     ED Triage Vitals [07/12/22 2245]   BP Temp Temp src Heart Rate Resp SpO2 Height Weight   (!) 138/112 97.8 °F (36.6 °C) -- (!) 102 16 100 % 5' 11\" (1.803 m) 165 lb (74.8 kg)       Physical Exam  Vitals reviewed. Constitutional:       Appearance: He is not ill-appearing or toxic-appearing. HENT:      Head: Normocephalic and atraumatic. Nose: No congestion or rhinorrhea.       Mouth/Throat:      Mouth: Mucous membranes are moist.   Eyes: Extraocular Movements: Extraocular movements intact. Pupils: Pupils are equal, round, and reactive to light. Cardiovascular:      Rate and Rhythm: Tachycardia present. Pulmonary:      Effort: Pulmonary effort is normal. No respiratory distress. Chest:      Chest wall: No tenderness. Abdominal:      Palpations: Abdomen is soft. Tenderness: There is no abdominal tenderness. There is no guarding. Musculoskeletal:         General: Tenderness present. Cervical back: Normal range of motion and neck supple. Comments: Mild diffuse tenderness in lateral left foot; no obvious deformtity  2+ dp/pt pulses in left foot   Skin:     General: Skin is warm. Capillary Refill: Capillary refill takes less than 2 seconds. Neurological:      General: No focal deficit present. Mental Status: He is alert. Mental status is at baseline. DIAGNOSTIC RESULTS     Labs Reviewed - No data to display     RADIOLOGY:     Non-plain film images such as CT, Ultrasound and MRI are read by the radiologist. Plain radiographic images are visualized and preliminarily interpreted by the emergency physician. Interpretation per the Radiologist below, if available at the time of this note:    XR FOOT LEFT (MIN 3 VIEWS)   Final Result   Chronic calcaneal deformity related to fixation of a previous fracture, with   3 separate cannulated screws noted. No definite hardware complication. No acute osseous fracture or dislocation. ED BEDSIDE ULTRASOUND:   Performed by ED Physician Manish Delgado MD       LABS:  Labs Reviewed - No data to display    All other labs were within normal range or not returned as of this dictation.     EMERGENCY DEPARTMENT COURSE and DIFFERENTIAL DIAGNOSIS/MDM:   Vitals:    Vitals:    07/12/22 2245 07/12/22 2330   BP: (!) 138/112    Pulse: (!) 102    Resp: 16    Temp: 97.8 °F (36.6 °C)    SpO2: 100%    Weight: 165 lb (74.8 kg) 165 lb (74.8 kg)   Height: 5' 11\" (1.803 m) 5' 11\" (1.803 m)           MDM  Number of Diagnoses or Management Options  Left foot pain  Diagnosis management comments: 53-year-old male presents with left foot pain. He has a history of bilateral calcaneal fractures and underwent repair of those about 1 year ago. He had his repair done in [place]. He was walking outside today and then developed some pain in his left foot. Is concerned about possible complication or issue with the hardware. He has been ambulatory without difficulty. Denies any numbness or tingling in the foot. No focal logical deficits. He presents with elevated blood pressure. He has mild tachycardia. Vitals otherwise unremarkable. Overall the foot was atraumatic on exam.  It is intact neurovascularly. X-ray of the left foot is obtained and is nonacute. Discussed with patient. He will follow-up outpatient with his primary care physician and orthopedic physician. He is amatory without difficulty. He is discharged in stable condition with return precautions      -  Patient seen and evaluated in the emergency department. -  Triage and nursing notes reviewed and incorporated. -  Old chart records reviewed and incorporated. -  Work-up included:  See above  -  Results discussed with patient. CONSULTS:  None    PROCEDURES:  None performed unless otherwise noted below     Procedures        FINAL IMPRESSION      1. Left foot pain          DISPOSITION/PLAN   DISPOSITION Decision To Discharge 07/13/2022 01:32:15 AM      PATIENT REFERRED TO:  Your Orthopedic Surgeon          Kenn Nixon MD  [address]  [facility] [address] [phone]            DISCHARGE MEDICATIONS:  New Prescriptions    No medications on file       ED Provider Disposition Time  DISPOSITION Decision To Discharge 07/13/2022 01:32:15 AM      Appropriate personal protective equipment was worn during the patient's evaluation.   These included surgical, eye protection, surgical mask or in 95 respirator and gloves. The patient was also placed in a surgical mask for the prevention of possible spread of respiratory viral illnesses. The Patient was instructed to read the package inserts with any medication that was prescribed. Major potential reactions and medication interactions were discussed. The Patient understands that there are numerous possible adverse reactions not covered. The patient was also instructed to arrange follow-up with his or her primary care provider for review of any pending labwork or incidental findings on any radiology results that were obtained. All efforts were made to discuss any incidental findings that require further monitoring. Controlled Substances Monitoring:     No flowsheet data found.     (Please note that portions of this note were completed with a voice recognition program.  Efforts were made to edit the dictations but occasionally words are mis-transcribed.)    Sujey Miller MD (electronically signed)  Attending Emergency Physician           Sujey Miller MD  07/13/22 0147

## 2022-08-04 DIAGNOSIS — S92.001D CLOSED FRACTURE OF BOTH CALCANEI WITH ROUTINE HEALING, SUBSEQUENT ENCOUNTER: ICD-10-CM

## 2022-08-04 DIAGNOSIS — F17.200 SMOKER: ICD-10-CM

## 2022-08-04 DIAGNOSIS — S92.002D CLOSED FRACTURE OF BOTH CALCANEI WITH ROUTINE HEALING, SUBSEQUENT ENCOUNTER: ICD-10-CM

## 2022-08-04 RX ORDER — GABAPENTIN 300 MG/1
600 CAPSULE ORAL 2 TIMES DAILY
Qty: 120 CAPSULE | Refills: 1 | OUTPATIENT
Start: 2022-08-04 | End: 2023-01-31

## 2022-08-04 RX ORDER — BUPROPION HYDROCHLORIDE 150 MG/1
150 TABLET, EXTENDED RELEASE ORAL 2 TIMES DAILY
Qty: 60 TABLET | Refills: 0 | OUTPATIENT
Start: 2022-08-04

## 2022-08-04 RX ORDER — IBUPROFEN 600 MG/1
600 TABLET ORAL EVERY 8 HOURS PRN
Qty: 90 TABLET | Refills: 1 | OUTPATIENT
Start: 2022-08-04